# Patient Record
Sex: MALE | Race: WHITE | NOT HISPANIC OR LATINO | URBAN - METROPOLITAN AREA
[De-identification: names, ages, dates, MRNs, and addresses within clinical notes are randomized per-mention and may not be internally consistent; named-entity substitution may affect disease eponyms.]

---

## 2023-02-02 ENCOUNTER — TELEPHONE (OUTPATIENT)
Dept: UROLOGY | Facility: AMBULATORY SURGERY CENTER | Age: 58
End: 2023-02-02

## 2023-02-02 NOTE — TELEPHONE ENCOUNTER
Please Triage  New Patient    What is the reason for the patient’s appointment? Pt is following Dr Cookie Erwin for regular yearly exam     What office location does the patient prefer? [facility]    Imaging/Lab Results:    Do we accept the patient's insurance or is the patient Self-Pay? Insurance Provider: Drea Emery--- patient's wife will call back with information  Plan Type/Number:  Member ID#: Has the patient had any previous Urologist(s)? [facility] [place] [facility]    Have patient records been requested? If not are records showing in Epic: epic     Has the patient had any outside testing done? Does the patient have a personal history of cancer?  no

## 2023-05-09 PROBLEM — R97.20 ELEVATED PSA: Status: ACTIVE | Noted: 2023-05-09

## 2023-05-09 RX ORDER — AZELASTINE 1 MG/ML
SPRAY, METERED NASAL
COMMUNITY

## 2023-05-09 RX ORDER — MONTELUKAST SODIUM 10 MG/1
10 TABLET ORAL
COMMUNITY

## 2023-05-09 RX ORDER — ACETYLCYSTEINE 200 MG/ML
4 SOLUTION ORAL; RESPIRATORY (INHALATION)
COMMUNITY

## 2023-05-09 RX ORDER — ASPIRIN 81 MG/1
81 TABLET ORAL
COMMUNITY

## 2023-05-09 RX ORDER — FLUTICASONE PROPIONATE 50 MCG
1 SPRAY, SUSPENSION (ML) NASAL
COMMUNITY

## 2023-05-09 RX ORDER — PRAVASTATIN SODIUM 40 MG
40 TABLET ORAL
COMMUNITY

## 2023-05-10 ENCOUNTER — OFFICE VISIT (OUTPATIENT)
Dept: UROLOGY | Facility: CLINIC | Age: 58
End: 2023-05-10

## 2023-05-10 DIAGNOSIS — R97.20 ELEVATED PSA: Primary | ICD-10-CM

## 2023-05-10 DIAGNOSIS — R35.0 BENIGN PROSTATIC HYPERPLASIA WITH URINARY FREQUENCY: ICD-10-CM

## 2023-05-10 DIAGNOSIS — N40.1 BENIGN PROSTATIC HYPERPLASIA WITH URINARY FREQUENCY: ICD-10-CM

## 2023-05-10 LAB
POST-VOID RESIDUAL VOLUME, ML POC: 69 ML
SL AMB  POCT GLUCOSE, UA: NORMAL
SL AMB LEUKOCYTE ESTERASE,UA: NORMAL
SL AMB POCT BILIRUBIN,UA: NORMAL
SL AMB POCT BLOOD,UA: NORMAL
SL AMB POCT CLARITY,UA: CLEAR
SL AMB POCT COLOR,UA: YELLOW
SL AMB POCT KETONES,UA: NORMAL
SL AMB POCT NITRITE,UA: NORMAL
SL AMB POCT PH,UA: 5.5
SL AMB POCT SPECIFIC GRAVITY,UA: 1.02
SL AMB POCT URINE PROTEIN: NORMAL
SL AMB POCT UROBILINOGEN: 0.2

## 2023-05-10 RX ORDER — CIPROFLOXACIN 500 MG/1
TABLET, FILM COATED ORAL
COMMUNITY
Start: 2023-05-04

## 2023-05-10 RX ORDER — CIPROFLOXACIN 500 MG/1
500 TABLET, FILM COATED ORAL EVERY 12 HOURS SCHEDULED
Qty: 14 TABLET | Refills: 0 | Status: SHIPPED | OUTPATIENT
Start: 2023-05-10 | End: 2023-05-17

## 2023-05-10 RX ORDER — ALFUZOSIN HYDROCHLORIDE 10 MG/1
TABLET, EXTENDED RELEASE ORAL
COMMUNITY
Start: 2023-03-28

## 2023-05-10 NOTE — PROGRESS NOTES
UROLOGY PROGRESS NOTE         NAME: Bruna Ortiz  AGE: 60 y o  SEX: male  : 1965   MRN: [id number]    DATE: 5/10/2023  TIME: 12:21 PM    Assessment and Plan      Impression:   1  Elevated PSA  -     POCT Measure PVR  -     POCT urine dip auto non-scope  -     PSA Total, Diagnostic; Future; Expected date: 2023    2  Benign prostatic hyperplasia with urinary frequency  -     US kidney and bladder; Future; Expected date: 05/10/2023  -     ciprofloxacin (CIPRO) 500 mg tablet; Take 1 tablet (500 mg total) by mouth every 12 (twelve) hours for 7 days       Plan: Finish Cipro  Continue alfuzosin  Renal and bladder sonogram recommended  Repeat PSA in about 1- 2 months  Follow-up 6 months or sooner if indicated  Possibly had prostatitis  Will be sure Cipro continued for full 10 to 14 days for this course  Cipro called in  Patient will continue his alfuzosin  Avoid constipation as he feels this precipitated the episode  Chief Complaint     Chief Complaint   Patient presents with   • Urinary Tract Infection     History of Present Illness     HPI: Bruna Ortiz is a 60y o  year old male who presents with history of recently elevated PSA of 15 5  PSA last year was less than 2  Urinalysis recently from PCP appears to be infected  Patient known to me from [facility]  Recent infection treated with Cipro  He has 1 pill left  It appears as though PSA was done around the time of the infection  PSA elevation possibly related to that infection  Postvoid residual today was normal approximately 60 cc  Urinalysis today normal              The following portions of the patient's history were reviewed and updated as appropriate: allergies, current medications, past family history, past medical history, past social history, past surgical history and problem list   Past Medical History:   Diagnosis Date   • Elevated PSA    • Urinary tract infection      History reviewed   No pertinent surgical "history  shoulder  Review of Systems     Const: Denies chills, fever and weight loss  CV: Denies chest pain  Resp: Denies SOB  GI: Denies abdominal pain, nausea and vomiting  : Denies symptoms other than stated above  Musculo: Denies back pain  Objective   BP (P) 130/82 (BP Location: Left arm, Patient Position: Sitting, Cuff Size: Adult)   Pulse (P) 79   Temp (!) (P) 97 °F (36 1 °C) (Temporal)   Ht (P) 5' 10\" (1 778 m)   Wt (P) 98 2 kg (216 lb 9 6 oz)   SpO2 (P) 98%   BMI (P) 31 08 kg/m²     Physical Exam  Const: Appears healthy and well developed  No signs of acute distress present  Resp: Respirations are regular and unlabored  CV: Rate is regular  Rhythm is regular  Abdomen: Abdomen is soft, nontender, and nondistended  Kidneys are not palpable  : Prostate is 2-3+ smooth soft there is mild asymmetry which has been present previously left lobe larger than right but no tenderness  No nodule nothing suspicious  Psych: Patient's attitude is cooperative   Mood is normal  Affect is normal     Procedure   Procedures     Current Medications     Current Outpatient Medications:   •  alfuzosin (UROXATRAL) 10 mg 24 hr tablet, , Disp: , Rfl:   •  aspirin (ECOTRIN LOW STRENGTH) 81 mg EC tablet, Take 81 mg by mouth, Disp: , Rfl:   •  azelastine (ASTELIN) 0 1 % nasal spray, , Disp: , Rfl:   •  Cholecalciferol 50 MCG (2000 UT) TABS, Take by mouth, Disp: , Rfl:   •  ciprofloxacin (CIPRO) 500 mg tablet, TAKE ONE TABLET TWICE DAILY FOR 7 DAYS, Disp: , Rfl:   •  ciprofloxacin (CIPRO) 500 mg tablet, Take 1 tablet (500 mg total) by mouth every 12 (twelve) hours for 7 days, Disp: 14 tablet, Rfl: 0  •  cyanocobalamin (VITAMIN B-12) 1000 MCG tablet, Take 1,000 mcg by mouth, Disp: , Rfl:   •  esomeprazole (NexIUM) 20 mg capsule, Take 20 mg by mouth, Disp: , Rfl:   •  esomeprazole (NexIUM) 20 mg capsule, Take 20 mg by mouth every morning before breakfast, Disp: , Rfl:   •  fluticasone (FLONASE) 50 mcg/act nasal spray, " 1 spray into each nostril, Disp: , Rfl:   •  montelukast (SINGULAIR) 10 mg tablet, Take 10 mg by mouth, Disp: , Rfl:   •  Multiple Vitamin (MULTIVITAMIN ADULT PO), Take 1 tablet by mouth daily, Disp: , Rfl:   •  pravastatin (PRAVACHOL) 40 mg tablet, Take 40 mg by mouth, Disp: , Rfl:   •  acetylcysteine (MUCOMYST) 200 mg/mL nebulizer solution, Inhale 4 mL (Patient not taking: Reported on 5/10/2023), Disp: , Rfl:         Jayla Moscoso MD

## 2023-05-10 NOTE — PATIENT INSTRUCTIONS
Please  your Cipro and take for an additional 3 days  Get your kidney ultrasound performed  Please get a PSA blood test rechecked in 6 to 8 weeks    Follow-up in 6 months

## 2023-06-12 ENCOUNTER — HOSPITAL ENCOUNTER (OUTPATIENT)
Dept: ULTRASOUND IMAGING | Facility: HOSPITAL | Age: 58
Discharge: HOME | End: 2023-06-12
Attending: UROLOGY
Payer: COMMERCIAL

## 2023-06-12 DIAGNOSIS — N40.1 BENIGN PROSTATIC HYPERPLASIA WITH URINARY FREQUENCY: ICD-10-CM

## 2023-06-12 DIAGNOSIS — R35.0 BENIGN PROSTATIC HYPERPLASIA WITH URINARY FREQUENCY: ICD-10-CM

## 2023-06-12 PROCEDURE — 76775 US EXAM ABDO BACK WALL LIM: CPT

## 2023-06-30 LAB — PSA SERPL-MCNC: 1.82 NG/ML

## 2023-07-18 ENCOUNTER — TELEPHONE (OUTPATIENT)
Dept: UROLOGY | Facility: AMBULATORY SURGERY CENTER | Age: 58
End: 2023-07-18

## 2023-07-18 NOTE — TELEPHONE ENCOUNTER
Patient under care of Dr. Ishmael Boyer    Reason for call: wife called to get results of 's u/s.  She said she's working from home and might not be able to  but to please leave a message    Patient can be reached at [phone]

## 2023-12-21 ENCOUNTER — OFFICE VISIT (OUTPATIENT)
Dept: UROLOGY | Facility: CLINIC | Age: 58
End: 2023-12-21
Payer: COMMERCIAL

## 2023-12-21 VITALS
OXYGEN SATURATION: 98 % | HEIGHT: 70 IN | DIASTOLIC BLOOD PRESSURE: 80 MMHG | RESPIRATION RATE: 18 BRPM | HEART RATE: 74 BPM | WEIGHT: 221.2 LBS | BODY MASS INDEX: 31.67 KG/M2 | TEMPERATURE: 97.9 F | SYSTOLIC BLOOD PRESSURE: 122 MMHG

## 2023-12-21 DIAGNOSIS — R97.20 ELEVATED PSA: Primary | ICD-10-CM

## 2023-12-21 LAB
SL AMB  POCT GLUCOSE, UA: ABNORMAL
SL AMB LEUKOCYTE ESTERASE,UA: ABNORMAL
SL AMB POCT BILIRUBIN,UA: ABNORMAL
SL AMB POCT BLOOD,UA: ABNORMAL
SL AMB POCT CLARITY,UA: CLEAR
SL AMB POCT COLOR,UA: YELLOW
SL AMB POCT KETONES,UA: ABNORMAL
SL AMB POCT NITRITE,UA: ABNORMAL
SL AMB POCT PH,UA: 6
SL AMB POCT SPECIFIC GRAVITY,UA: 1.02
SL AMB POCT URINE PROTEIN: ABNORMAL
SL AMB POCT UROBILINOGEN: 0.2

## 2023-12-21 PROCEDURE — 99213 OFFICE O/P EST LOW 20 MIN: CPT | Performed by: UROLOGY

## 2023-12-21 PROCEDURE — 81003 URINALYSIS AUTO W/O SCOPE: CPT | Performed by: UROLOGY

## 2023-12-21 RX ORDER — CIPROFLOXACIN 500 MG/1
500 TABLET, FILM COATED ORAL EVERY 12 HOURS SCHEDULED
Qty: 14 TABLET | Refills: 0 | Status: SHIPPED | OUTPATIENT
Start: 2023-12-21 | End: 2023-12-28

## 2024-07-28 LAB — PSA SERPL-MCNC: 1.07 NG/ML

## 2024-12-12 PROBLEM — N40.1 BENIGN PROSTATIC HYPERPLASIA WITH URINARY FREQUENCY: Status: ACTIVE | Noted: 2024-12-12

## 2024-12-12 PROBLEM — R35.0 BENIGN PROSTATIC HYPERPLASIA WITH URINARY FREQUENCY: Status: ACTIVE | Noted: 2024-12-12

## 2024-12-13 ENCOUNTER — OFFICE VISIT (OUTPATIENT)
Dept: UROLOGY | Facility: CLINIC | Age: 59
End: 2024-12-13
Payer: COMMERCIAL

## 2024-12-13 ENCOUNTER — TELEPHONE (OUTPATIENT)
Age: 59
End: 2024-12-13

## 2024-12-13 VITALS
TEMPERATURE: 97.2 F | BODY MASS INDEX: 32.78 KG/M2 | WEIGHT: 229 LBS | DIASTOLIC BLOOD PRESSURE: 82 MMHG | HEIGHT: 70 IN | OXYGEN SATURATION: 97 % | HEART RATE: 91 BPM | SYSTOLIC BLOOD PRESSURE: 134 MMHG

## 2024-12-13 DIAGNOSIS — N40.1 BENIGN PROSTATIC HYPERPLASIA WITH URINARY FREQUENCY: Primary | ICD-10-CM

## 2024-12-13 DIAGNOSIS — R97.20 ELEVATED PSA: ICD-10-CM

## 2024-12-13 DIAGNOSIS — R35.0 BENIGN PROSTATIC HYPERPLASIA WITH URINARY FREQUENCY: Primary | ICD-10-CM

## 2024-12-13 LAB
SL AMB  POCT GLUCOSE, UA: NORMAL
SL AMB LEUKOCYTE ESTERASE,UA: NORMAL
SL AMB POCT BILIRUBIN,UA: NORMAL
SL AMB POCT BLOOD,UA: NORMAL
SL AMB POCT CLARITY,UA: CLEAR
SL AMB POCT COLOR,UA: YELLOW
SL AMB POCT KETONES,UA: NORMAL
SL AMB POCT NITRITE,UA: NORMAL
SL AMB POCT PH,UA: 6
SL AMB POCT SPECIFIC GRAVITY,UA: <1.005
SL AMB POCT URINE PROTEIN: NORMAL
SL AMB POCT UROBILINOGEN: 0.2

## 2024-12-13 PROCEDURE — 81003 URINALYSIS AUTO W/O SCOPE: CPT | Performed by: UROLOGY

## 2024-12-13 PROCEDURE — 99214 OFFICE O/P EST MOD 30 MIN: CPT | Performed by: UROLOGY

## 2024-12-13 RX ORDER — TADALAFIL 5 MG/1
5 TABLET ORAL DAILY PRN
Qty: 90 TABLET | Refills: 3 | Status: SHIPPED | OUTPATIENT
Start: 2024-12-13

## 2024-12-13 NOTE — PROGRESS NOTES
"UROLOGY PROGRESS NOTE         NAME: Leandro Goode  AGE: 59 y.o. SEX: male  : 1965   MRN: [id number]    DATE: 2024  TIME: 9:50 AM    Assessment and Plan      Impression:   1. Benign prostatic hyperplasia with urinary frequency  -     POCT urine dip auto non-scope  -     tadalafil (CIALIS) 5 MG tablet; Take 1 tablet (5 mg total) by mouth daily as needed (BPH)  2. Elevated PSA  -     PSA Total, Diagnostic; Future       Plan: Patient has stable mild BPH symptoms.  His prostate is about 3+ on exam.  He would like to continue the combination of alfuzosin and tadalafil daily.  I gave him a new prescription for his tadalafil.  He would be a candidate for finasteride if his symptoms would worsen in the future.  Urinalysis was normal today.  Follow-up 1 year with PSA prior      Chief Complaint     Chief Complaint   Patient presents with   • Elevated PSA     History of Present Illness     HPI: Leandro Goode is a 59 y.o. year old male who presents with history of BPH and previously elevated PSA.  Recent PSA earlier this year was 1.07.  Is on alfuzosin    Some urgency at times but sleeps through the night.  Reasonable flow.  No significant hesitancy other than a.m.          The following portions of the patient's history were reviewed and updated as appropriate: allergies, current medications, past family history, past medical history, past social history, past surgical history and problem list.  Past Medical History:   Diagnosis Date   • Elevated PSA    • Urinary tract infection      No past surgical history on file.  shoulder  Review of Systems     Const: Denies chills, fever and weight loss.  CV: Denies chest pain.  Resp: Denies SOB.  GI: Denies abdominal pain, nausea and vomiting.  : Denies symptoms other than stated above.  Musculo: Denies back pain.    Objective   /82   Pulse 91   Temp (!) 97.2 °F (36.2 °C)   Ht 5' 10\" (1.778 m)   Wt 104 kg (229 lb)   SpO2 97%   BMI 32.86 kg/m² "     Physical Exam  Const: Appears healthy and well developed. No signs of acute distress present.  Resp: Respirations are regular and unlabored.   CV: Rate is regular. Rhythm is regular.  Abdomen: Abdomen is soft, nontender, and nondistended. Kidneys are not palpable.  : Prostate is 3+ smooth soft symmetric elastic and there are no nodules.  Psych: Patient's attitude is cooperative. Mood is normal. Affect is normal.    Procedure   Procedures     Current Medications     Current Outpatient Medications:   •  alfuzosin (UROXATRAL) 10 mg 24 hr tablet, Take 10 mg by mouth daily, Disp: , Rfl:   •  aspirin (ECOTRIN LOW STRENGTH) 81 mg EC tablet, Take 81 mg by mouth every other day, Disp: , Rfl:   •  azelastine (ASTELIN) 0.1 % nasal spray, 1 spray into each nostril 2 (two) times a day, Disp: , Rfl:   •  Cholecalciferol 50 MCG (2000 UT) TABS, Take by mouth in the morning, Disp: , Rfl:   •  ciprofloxacin (CIPRO) 500 mg tablet, , Disp: , Rfl:   •  cyanocobalamin (VITAMIN B-12) 1000 MCG tablet, Take 1,000 mcg by mouth, Disp: , Rfl:   •  esomeprazole (NexIUM) 20 mg capsule, Take 20 mg by mouth daily in the early morning, Disp: , Rfl:   •  fluticasone (FLONASE) 50 mcg/act nasal spray, 1 spray into each nostril daily, Disp: , Rfl:   •  Multiple Vitamin (MULTIVITAMIN ADULT PO), Take 1 tablet by mouth daily, Disp: , Rfl:   •  pravastatin (PRAVACHOL) 40 mg tablet, Take 40 mg by mouth daily, Disp: , Rfl:   •  tadalafil (CIALIS) 5 MG tablet, Take 1 tablet (5 mg total) by mouth daily as needed (BPH), Disp: 90 tablet, Rfl: 3        Bella Feliciano MD

## 2024-12-16 ENCOUNTER — TELEPHONE (OUTPATIENT)
Age: 59
End: 2024-12-16

## 2024-12-16 NOTE — TELEPHONE ENCOUNTER
PA for TADALAFIL 5 MG  EXCLUDED from plan       Reason:(Screenshot if applicable)    NOT ELIGIBLE FOR COVERAGE OR APPEAL    GOOD RX OPTION AVAILABLE TO PT    Message sent to office clinical pool No

## 2024-12-16 NOTE — TELEPHONE ENCOUNTER
PA for TADALAFIL 5 MG BPH SUBMITTED to [employer]    via      [x][employer]-Case ID # [id number]       []PA sent as URGENT    All office notes, labs and other pertaining documents and studies sent. Clinical questions answered. Awaiting determination from insurance company.     Turnaround time for your insurance to make a decision on your Prior Authorization can take 7-21 business days.